# Patient Record
Sex: FEMALE | Race: WHITE | NOT HISPANIC OR LATINO | URBAN - NONMETROPOLITAN AREA
[De-identification: names, ages, dates, MRNs, and addresses within clinical notes are randomized per-mention and may not be internally consistent; named-entity substitution may affect disease eponyms.]

---

## 2020-10-02 ENCOUNTER — LAB OUTSIDE AN ENCOUNTER (OUTPATIENT)
Dept: URBAN - NONMETROPOLITAN AREA CLINIC 13 | Facility: CLINIC | Age: 80
End: 2020-10-02

## 2020-10-02 ENCOUNTER — OFFICE VISIT (OUTPATIENT)
Dept: URBAN - NONMETROPOLITAN AREA CLINIC 13 | Facility: CLINIC | Age: 80
End: 2020-10-02
Payer: MEDICARE

## 2020-10-02 DIAGNOSIS — K44.9 DIAPHRAGMATIC HERNIA: ICD-10-CM

## 2020-10-02 DIAGNOSIS — K21.9 GERD: ICD-10-CM

## 2020-10-02 DIAGNOSIS — K59.00 CONSTIPATION, UNSPECIFIED CONSTIPATION TYPE: ICD-10-CM

## 2020-10-02 DIAGNOSIS — R10.13 EPIGASTRIC PAIN: ICD-10-CM

## 2020-10-02 DIAGNOSIS — R13.10 DYSPHAGIA: ICD-10-CM

## 2020-10-02 DIAGNOSIS — R11.0 NAUSEA: ICD-10-CM

## 2020-10-02 DIAGNOSIS — Z12.11 SCREENING FOR COLON CANCER: ICD-10-CM

## 2020-10-02 PROCEDURE — G8427 DOCREV CUR MEDS BY ELIG CLIN: HCPCS | Performed by: INTERNAL MEDICINE

## 2020-10-02 PROCEDURE — G8417 CALC BMI ABV UP PARAM F/U: HCPCS | Performed by: INTERNAL MEDICINE

## 2020-10-02 PROCEDURE — G9903 PT SCRN TBCO ID AS NON USER: HCPCS | Performed by: INTERNAL MEDICINE

## 2020-10-02 PROCEDURE — 99214 OFFICE O/P EST MOD 30 MIN: CPT | Performed by: INTERNAL MEDICINE

## 2020-10-02 PROCEDURE — G8420 CALC BMI NORM PARAMETERS: HCPCS | Performed by: INTERNAL MEDICINE

## 2020-10-02 RX ORDER — CHLORHEXIDINE GLUCONATE 4 %
LIQUID (ML) TOPICAL
Qty: 0 | Refills: 0 | Status: ACTIVE | COMMUNITY
Start: 1900-01-01

## 2020-10-02 RX ORDER — FLUTICASONE PROPIONATE 50 UG/1
SPRAY, METERED NASAL
Qty: 0 | Refills: 0 | Status: ACTIVE | COMMUNITY
Start: 1900-01-01

## 2020-10-02 RX ORDER — FAMOTIDINE 20 MG/1
1 TABLET AT BEDTIME AS NEEDED TABLET, FILM COATED ORAL ONCE A DAY
Qty: 30 TABLET | Refills: 6 | OUTPATIENT
Start: 2020-10-02

## 2020-10-02 RX ORDER — HYDROCHLOROTHIAZIDE 25 MG/1
1 TABLET IN THE MORNING TABLET ORAL ONCE A DAY
Status: ACTIVE | COMMUNITY

## 2020-10-02 RX ORDER — LATANOPROST/PF 0.005 %
DROPS OPHTHALMIC (EYE)
Qty: 0 | Refills: 0 | Status: ACTIVE | COMMUNITY
Start: 1900-01-01

## 2020-10-02 RX ORDER — GLUCOSAMINE SULFATE 500 MG
TABLET ORAL
Qty: 0 | Refills: 0 | Status: ON HOLD | COMMUNITY
Start: 1900-01-01

## 2020-10-02 NOTE — HPI-OTHER HISTORIES
4/2/19   Ms. Greenberg presents today for f/u of her history of hiatal hernia with dysphagia, reflux and a history of colon polyps.  Since our last visit, she did have an UGI.  It did reveal evidence of a previous Nissen with a small hernia and a non-obstructing ring at the distal esophagus.   A pill passed without difficulty.  Her dysphagia has immproved.  As long as she eats slowly, she feels that her symptoms are fairly well controlled.   Her bowels have been regular.  She is due for a repeat colonoscopy.   The patient had a previous colonoscopy approximately 5 years ago. It revealed a polyp in the right colon. The patient reports the risk factors for colon cancer of a history of polyps. There is no recent history of rectal bleeding.  THe patient was seen at [facility]. She had hiatal hernia surgery done in 2014 and a re-do in 2015.  She had a large hiatal hernia causing reflux.  Recently, her symtpoms have become worse.  She is having more shortness of breath.  She is also having dysphagia. This mainly occurs at night.  She is on coumadin for protein C/S deficiency.

## 2020-10-02 NOTE — HPI-TODAY'S VISIT:
HPI : The patient presents today for follow-up of her reflux and dysphasia.  She has been having worsening problems with nausea and epigastric pain.  She wakes up almost every morning with ongoing nausea, and it gets worse throughout the day.  She denies any dysphasia, but she also has epigastric pain.  She feels that if she gets constipated at all, she will have nausea and vomiting.  She is taking omeprazole 20 mg daily.  She is not taking anything for her bowels at this time.  She is willing to repeat her EGD given her ongoing symptoms.

## 2020-11-05 ENCOUNTER — OFFICE VISIT (OUTPATIENT)
Dept: URBAN - METROPOLITAN AREA MEDICAL CENTER 1 | Facility: MEDICAL CENTER | Age: 80
End: 2020-11-05
Payer: MEDICARE

## 2020-11-05 DIAGNOSIS — K22.2 ACQUIRED ESOPHAGEAL RING: ICD-10-CM

## 2020-11-05 DIAGNOSIS — K29.30 CHRONIC SUPERFICIAL GASTRITIS: ICD-10-CM

## 2020-11-05 DIAGNOSIS — K20.80 LOS ANGELES GRADE B ESOPHAGITIS: ICD-10-CM

## 2020-11-05 PROCEDURE — 43249 ESOPH EGD DILATION <30 MM: CPT | Performed by: INTERNAL MEDICINE

## 2020-11-05 PROCEDURE — 43239 EGD BIOPSY SINGLE/MULTIPLE: CPT | Performed by: INTERNAL MEDICINE

## 2020-12-14 ENCOUNTER — OFFICE VISIT (OUTPATIENT)
Dept: URBAN - NONMETROPOLITAN AREA CLINIC 2 | Facility: CLINIC | Age: 80
End: 2020-12-14
Payer: MEDICARE

## 2020-12-14 VITALS
SYSTOLIC BLOOD PRESSURE: 139 MMHG | HEIGHT: 65 IN | TEMPERATURE: 96.9 F | WEIGHT: 180 LBS | BODY MASS INDEX: 29.99 KG/M2 | HEART RATE: 76 BPM | DIASTOLIC BLOOD PRESSURE: 82 MMHG

## 2020-12-14 DIAGNOSIS — K44.9 DIAPHRAGMATIC HERNIA: ICD-10-CM

## 2020-12-14 DIAGNOSIS — R13.10 DYSPHAGIA: ICD-10-CM

## 2020-12-14 DIAGNOSIS — Z12.11 SCREENING FOR COLON CANCER: ICD-10-CM

## 2020-12-14 DIAGNOSIS — K21.9 GERD: ICD-10-CM

## 2020-12-14 DIAGNOSIS — K59.00 CONSTIPATION, UNSPECIFIED CONSTIPATION TYPE: ICD-10-CM

## 2020-12-14 PROCEDURE — G8482 FLU IMMUNIZE ORDER/ADMIN: HCPCS | Performed by: NURSE PRACTITIONER

## 2020-12-14 PROCEDURE — G8427 DOCREV CUR MEDS BY ELIG CLIN: HCPCS | Performed by: NURSE PRACTITIONER

## 2020-12-14 PROCEDURE — 99213 OFFICE O/P EST LOW 20 MIN: CPT | Performed by: NURSE PRACTITIONER

## 2020-12-14 PROCEDURE — G8417 CALC BMI ABV UP PARAM F/U: HCPCS | Performed by: NURSE PRACTITIONER

## 2020-12-14 PROCEDURE — 1036F TOBACCO NON-USER: CPT | Performed by: NURSE PRACTITIONER

## 2020-12-14 RX ORDER — OMEPRAZOLE 40 MG/1
1 CAPSULE 30 MINUTES BEFORE MORNING MEAL CAPSULE, DELAYED RELEASE ORAL ONCE A DAY
Status: ACTIVE | COMMUNITY

## 2020-12-14 RX ORDER — LATANOPROST/PF 0.005 %
DROPS OPHTHALMIC (EYE)
Qty: 0 | Refills: 0 | Status: ACTIVE | COMMUNITY
Start: 1900-01-01

## 2020-12-14 RX ORDER — CHLORHEXIDINE GLUCONATE 4 %
LIQUID (ML) TOPICAL
Qty: 0 | Refills: 0 | Status: ACTIVE | COMMUNITY
Start: 1900-01-01

## 2020-12-14 RX ORDER — FAMOTIDINE 20 MG/1
1 TABLET AT BEDTIME AS NEEDED TABLET, FILM COATED ORAL ONCE A DAY
Qty: 30 TABLET | Refills: 6 | Status: ACTIVE | COMMUNITY
Start: 2020-10-02

## 2020-12-14 RX ORDER — FLUTICASONE PROPIONATE 50 UG/1
SPRAY, METERED NASAL
Qty: 0 | Refills: 0 | Status: ACTIVE | COMMUNITY
Start: 1900-01-01

## 2020-12-14 RX ORDER — HYDROCHLOROTHIAZIDE 25 MG/1
1 TABLET IN THE MORNING TABLET ORAL ONCE A DAY
Status: ACTIVE | COMMUNITY

## 2020-12-14 RX ORDER — GLUCOSAMINE SULFATE 500 MG
TABLET ORAL
Qty: 0 | Refills: 0 | Status: ON HOLD | COMMUNITY
Start: 1900-01-01

## 2020-12-14 RX ORDER — FLUTICASONE PROPIONATE 50 UG/1
1 SPRAY IN EACH NOSTRIL SPRAY, METERED NASAL ONCE A DAY
Status: ACTIVE | COMMUNITY

## 2020-12-14 RX ORDER — CETIRIZINE HYDROCHLORIDE 10 MG/1
1 TABLET TABLET, FILM COATED ORAL ONCE A DAY
Status: ACTIVE | COMMUNITY

## 2020-12-14 RX ORDER — BUDESONIDE AND FORMOTEROL FUMARATE DIHYDRATE 80; 4.5 UG/1; UG/1
2 PUFFS AEROSOL RESPIRATORY (INHALATION) ONCE A DAY
Status: ACTIVE | COMMUNITY

## 2020-12-14 NOTE — HPI-TODAY'S VISIT:
4/2/19 Ms. Greenberg presents today for f/u of her history of hiatal hernia with dysphagia, reflux and a history of colon polyps.  Since our last visit, she did have an UGI.  It did reveal evidence of a previous Nissen with a small hernia and a non-obstructing ring at the distal esophagus.   A pill passed without difficulty.  Her dysphagia has immproved.  As long as she eats slowly, she feels that her symptoms are fairly well controlled.   Her bowels have been regular.  She is due for a repeat colonoscopy.   The patient had a previous colonoscopy approximately 5 years ago. It revealed a polyp in the right colon. The patient reports the risk factors for colon cancer of a history of polyps. There is no recent history of rectal bleeding.  THe patient was seen at [facility]. She had hiatal hernia surgery done in 2014 and a re-do in 2015.  She had a large hiatal hernia causing reflux.  Recently, her symtpoms have become worse.  She is having more shortness of breath.  She is also having dysphagia. This mainly occurs at night.  She is on coumadin for protein C/S deficiency.  10/2/2020 The patient presents today for follow-up of her reflux and dysphasia.  She has been having worsening problems with nausea and epigastric pain.  She wakes up almost every morning with ongoing nausea, and it gets worse throughout the day.  She denies any dysphasia, but she also has epigastric pain.  She feels that if she gets constipated at all, she will have nausea and vomiting.  She is taking omeprazole 20 mg daily.  She is not taking anything for her bowels at this time.  She is willing to repeat her EGD given her ongoing symptoms.   12/14/2020   Mrs. Greenberg presents for endoscoy follow up. Her EGD shows mild reflux and gastritis along iwht a 3-4 cm hiatal hernia and schatsky's ring s/p dilation. Her PCP increased her omeprazole to 40mg daily and she is having no futher reflux or chest pressure at night. She denies dysphagia or odynophagia. She is not taking the miralax or fiber but agrees to try the fiber for bowel irregularity. Tdoay she is doing well otherwise. MB

## 2021-06-14 ENCOUNTER — WEB ENCOUNTER (OUTPATIENT)
Dept: URBAN - NONMETROPOLITAN AREA CLINIC 2 | Facility: CLINIC | Age: 81
End: 2021-06-14

## 2021-06-14 ENCOUNTER — OFFICE VISIT (OUTPATIENT)
Dept: URBAN - NONMETROPOLITAN AREA CLINIC 2 | Facility: CLINIC | Age: 81
End: 2021-06-14
Payer: MEDICARE

## 2021-06-14 VITALS
HEART RATE: 74 BPM | TEMPERATURE: 96.8 F | HEIGHT: 65 IN | SYSTOLIC BLOOD PRESSURE: 121 MMHG | BODY MASS INDEX: 29.99 KG/M2 | DIASTOLIC BLOOD PRESSURE: 70 MMHG | WEIGHT: 180 LBS

## 2021-06-14 DIAGNOSIS — K44.9 DIAPHRAGMATIC HERNIA: ICD-10-CM

## 2021-06-14 DIAGNOSIS — K59.00 CONSTIPATION, UNSPECIFIED CONSTIPATION TYPE: ICD-10-CM

## 2021-06-14 DIAGNOSIS — Z12.11 SCREENING FOR COLON CANCER: ICD-10-CM

## 2021-06-14 DIAGNOSIS — K21.9 GERD: ICD-10-CM

## 2021-06-14 DIAGNOSIS — R13.10 DYSPHAGIA: ICD-10-CM

## 2021-06-14 PROCEDURE — 99214 OFFICE O/P EST MOD 30 MIN: CPT | Performed by: INTERNAL MEDICINE

## 2021-06-14 RX ORDER — HYDROCHLOROTHIAZIDE 25 MG/1
1 TABLET IN THE MORNING TABLET ORAL ONCE A DAY
Status: ACTIVE | COMMUNITY

## 2021-06-14 RX ORDER — FAMOTIDINE 20 MG/1
1 TABLET AT BEDTIME AS NEEDED TABLET, FILM COATED ORAL ONCE A DAY
Qty: 30 TABLET | Refills: 6 | Status: ON HOLD | COMMUNITY
Start: 2020-10-02

## 2021-06-14 RX ORDER — CHLORHEXIDINE GLUCONATE 4 %
LIQUID (ML) TOPICAL
Qty: 0 | Refills: 0 | Status: ACTIVE | COMMUNITY
Start: 1900-01-01

## 2021-06-14 RX ORDER — GLUCOSAMINE SULFATE 500 MG
TABLET ORAL
Qty: 0 | Refills: 0 | Status: ACTIVE | COMMUNITY
Start: 1900-01-01

## 2021-06-14 RX ORDER — OMEPRAZOLE 40 MG/1
1 CAPSULE 30 MINUTES BEFORE MORNING MEAL CAPSULE, DELAYED RELEASE ORAL ONCE A DAY
Status: ACTIVE | COMMUNITY

## 2021-06-14 RX ORDER — CETIRIZINE HYDROCHLORIDE 10 MG/1
1 TABLET TABLET, FILM COATED ORAL ONCE A DAY
Status: ACTIVE | COMMUNITY

## 2021-06-14 RX ORDER — FLUTICASONE PROPIONATE 50 UG/1
SPRAY, METERED NASAL
Qty: 0 | Refills: 0 | Status: ON HOLD | COMMUNITY
Start: 1900-01-01

## 2021-06-14 RX ORDER — FLUTICASONE PROPIONATE 50 UG/1
1 SPRAY IN EACH NOSTRIL SPRAY, METERED NASAL ONCE A DAY
Status: ACTIVE | COMMUNITY

## 2021-06-14 RX ORDER — LATANOPROST/PF 0.005 %
DROPS OPHTHALMIC (EYE)
Qty: 0 | Refills: 0 | Status: ACTIVE | COMMUNITY
Start: 1900-01-01

## 2021-06-14 RX ORDER — BUDESONIDE AND FORMOTEROL FUMARATE DIHYDRATE 80; 4.5 UG/1; UG/1
2 PUFFS AEROSOL RESPIRATORY (INHALATION) ONCE A DAY
Status: ACTIVE | COMMUNITY

## 2021-06-14 NOTE — HPI-TODAY'S VISIT:
4/2/19 Ms. Greenberg presents today for f/u of her history of hiatal hernia with dysphagia, reflux and a history of colon polyps.  Since our last visit, she did have an UGI.  It did reveal evidence of a previous Nissen with a small hernia and a non-obstructing ring at the distal esophagus.   A pill passed without difficulty.  Her dysphagia has immproved.  As long as she eats slowly, she feels that her symptoms are fairly well controlled.   Her bowels have been regular.  She is due for a repeat colonoscopy.   The patient had a previous colonoscopy approximately 5 years ago. It revealed a polyp in the right colon. The patient reports the risk factors for colon cancer of a history of polyps. There is no recent history of rectal bleeding.  THe patient was seen at [facility]. She had hiatal hernia surgery done in 2014 and a re-do in 2015.  She had a large hiatal hernia causing reflux.  Recently, her symtpoms have become worse.  She is having more shortness of breath.  She is also having dysphagia. This mainly occurs at night.  She is on coumadin for protein C/S deficiency.  10/2/2020 The patient presents today for follow-up of her reflux and dysphasia.  She has been having worsening problems with nausea and epigastric pain.  She wakes up almost every morning with ongoing nausea, and it gets worse throughout the day.  She denies any dysphasia, but she also has epigastric pain.  She feels that if she gets constipated at all, she will have nausea and vomiting.  She is taking omeprazole 20 mg daily.  She is not taking anything for her bowels at this time.  She is willing to repeat her EGD given her ongoing symptoms.   12/14/2020  Mrs. Greenberg presents for endoscoy follow up. Her EGD shows mild reflux and gastritis along iwht a 3-4 cm hiatal hernia and schatsky's ring s/p dilation. Her PCP increased her omeprazole to 40mg daily and she is having no futher reflux or chest pressure at night. She denies dysphagia or odynophagia. She is not taking the miralax or fiber but agrees to try the fiber for bowel irregularity. Tdoay she is doing well otherwise. MB   6/14/2021  Mrs. Greenberg presents for follow up of reflux, hiatal hernia, and dysphagia. She is doing well on omeprazole 40mg daily. She still has occasional dysphagia once a week or so but doesn't feel that it is severe and declines repeat EGD with dilation. Her bowels are fairly regular when she remembers to take her fiber but is forgetful of this. Today she is doing well with no new GI complaints. MB

## 2021-06-17 ENCOUNTER — WEB ENCOUNTER (OUTPATIENT)
Dept: URBAN - NONMETROPOLITAN AREA CLINIC 2 | Facility: CLINIC | Age: 81
End: 2021-06-17

## 2021-10-01 ENCOUNTER — OFFICE VISIT (OUTPATIENT)
Dept: URBAN - NONMETROPOLITAN AREA CLINIC 13 | Facility: CLINIC | Age: 81
End: 2021-10-01

## 2021-12-13 ENCOUNTER — OFFICE VISIT (OUTPATIENT)
Dept: URBAN - NONMETROPOLITAN AREA CLINIC 2 | Facility: CLINIC | Age: 81
End: 2021-12-13

## 2022-02-15 ENCOUNTER — OFFICE VISIT (OUTPATIENT)
Dept: URBAN - NONMETROPOLITAN AREA CLINIC 2 | Facility: CLINIC | Age: 82
End: 2022-02-15
Payer: MEDICARE

## 2022-02-15 VITALS
DIASTOLIC BLOOD PRESSURE: 73 MMHG | BODY MASS INDEX: 27.49 KG/M2 | HEIGHT: 65 IN | WEIGHT: 165 LBS | TEMPERATURE: 96.5 F | SYSTOLIC BLOOD PRESSURE: 129 MMHG | HEART RATE: 80 BPM

## 2022-02-15 DIAGNOSIS — K59.09 CONSTIPATION: ICD-10-CM

## 2022-02-15 DIAGNOSIS — K21.9 GERD: ICD-10-CM

## 2022-02-15 DIAGNOSIS — Z12.11 SCREENING FOR COLON CANCER: ICD-10-CM

## 2022-02-15 DIAGNOSIS — K44.9 DIAPHRAGMATIC HERNIA: ICD-10-CM

## 2022-02-15 DIAGNOSIS — R13.19 DYSPHAGIA: ICD-10-CM

## 2022-02-15 PROCEDURE — 99214 OFFICE O/P EST MOD 30 MIN: CPT | Performed by: NURSE PRACTITIONER

## 2022-02-15 RX ORDER — FLUTICASONE PROPIONATE 50 UG/1
1 SPRAY IN EACH NOSTRIL SPRAY, METERED NASAL ONCE A DAY
Status: ACTIVE | COMMUNITY

## 2022-02-15 RX ORDER — OMEPRAZOLE 40 MG/1
1 CAPSULE 30 MINUTES BEFORE MORNING MEAL CAPSULE, DELAYED RELEASE ORAL ONCE A DAY
Status: ACTIVE | COMMUNITY

## 2022-02-15 RX ORDER — BUDESONIDE AND FORMOTEROL FUMARATE DIHYDRATE 80; 4.5 UG/1; UG/1
2 PUFFS AEROSOL RESPIRATORY (INHALATION) ONCE A DAY
Status: ACTIVE | COMMUNITY

## 2022-02-15 RX ORDER — HYDROCHLOROTHIAZIDE 25 MG/1
1 TABLET IN THE MORNING TABLET ORAL ONCE A DAY
Status: ACTIVE | COMMUNITY

## 2022-02-15 RX ORDER — FLUTICASONE PROPIONATE 50 UG/1
SPRAY, METERED NASAL
Qty: 0 | Refills: 0 | Status: ON HOLD | COMMUNITY
Start: 1900-01-01

## 2022-02-15 RX ORDER — LATANOPROST/PF 0.005 %
DROPS OPHTHALMIC (EYE)
Qty: 0 | Refills: 0 | Status: ACTIVE | COMMUNITY
Start: 1900-01-01

## 2022-02-15 RX ORDER — GLUCOSAMINE SULFATE 500 MG
TABLET ORAL
Qty: 0 | Refills: 0 | Status: ACTIVE | COMMUNITY
Start: 1900-01-01

## 2022-02-15 RX ORDER — FAMOTIDINE 20 MG/1
1 TABLET AT BEDTIME AS NEEDED TABLET, FILM COATED ORAL ONCE A DAY
Qty: 30 TABLET | Refills: 6 | Status: ON HOLD | COMMUNITY
Start: 2020-10-02

## 2022-02-15 RX ORDER — CHLORHEXIDINE GLUCONATE 4 %
LIQUID (ML) TOPICAL
Qty: 0 | Refills: 0 | Status: ACTIVE | COMMUNITY
Start: 1900-01-01

## 2022-02-15 RX ORDER — CETIRIZINE HYDROCHLORIDE 10 MG/1
1 TABLET TABLET, FILM COATED ORAL ONCE A DAY
Status: ACTIVE | COMMUNITY

## 2022-02-15 NOTE — HPI-TODAY'S VISIT:
4/2/19 Ms. Greenberg presents today for f/u of her history of hiatal hernia with dysphagia, reflux and a history of colon polyps.  Since our last visit, she did have an UGI.  It did reveal evidence of a previous Nissen with a small hernia and a non-obstructing ring at the distal esophagus.   A pill passed without difficulty.  Her dysphagia has immproved.  As long as she eats slowly, she feels that her symptoms are fairly well controlled.   Her bowels have been regular.  She is due for a repeat colonoscopy.   The patient had a previous colonoscopy approximately 5 years ago. It revealed a polyp in the right colon. The patient reports the risk factors for colon cancer of a history of polyps. There is no recent history of rectal bleeding.  THe patient was seen at [facility]. She had hiatal hernia surgery done in 2014 and a re-do in 2015.  She had a large hiatal hernia causing reflux.  Recently, her symtpoms have become worse.  She is having more shortness of breath.  She is also having dysphagia. This mainly occurs at night.  She is on coumadin for protein C/S deficiency.  10/2/2020 The patient presents today for follow-up of her reflux and dysphasia.  She has been having worsening problems with nausea and epigastric pain.  She wakes up almost every morning with ongoing nausea, and it gets worse throughout the day.  She denies any dysphasia, but she also has epigastric pain.  She feels that if she gets constipated at all, she will have nausea and vomiting.  She is taking omeprazole 20 mg daily.  She is not taking anything for her bowels at this time.  She is willing to repeat her EGD given her ongoing symptoms.   12/14/2020  Mrs. Greenberg presents for endoscoy follow up. Her EGD shows mild reflux and gastritis along iwht a 3-4 cm hiatal hernia and schatsky's ring s/p dilation. Her PCP increased her omeprazole to 40mg daily and she is having no futher reflux or chest pressure at night. She denies dysphagia or odynophagia. She is not taking the miralax or fiber but agrees to try the fiber for bowel irregularity. Tdoay she is doing well otherwise. MB   6/14/2021  Mrs. Greenberg presents for follow up of reflux, hiatal hernia, and dysphagia. She is doing well on omeprazole 40mg daily. She still has occasional dysphagia once a week or so but doesn't feel that it is severe and declines repeat EGD with dilation. Her bowels are fairly regular when she remembers to take her fiber but is forgetful of this. Today she is doing well with no new GI complaints. MB   2/15/2022 Elsy presents for follow up of reflux, hiatal hernia, dysphagia and constipation. She is on omeprazole 40mg daily. She still has occasional dysphagia with regurgitation of the bolus particularly with rice. She doesn't feel like she needs a dilation. She has not had her DEXA this year. She does feel the omeprazole 40mg daily helps her reflux and dysphagia. She can't take metamucil. She has been using stool softeners as needed with improvement. Today she is doing well otherwise. MB

## 2023-02-21 ENCOUNTER — OFFICE VISIT (OUTPATIENT)
Dept: URBAN - NONMETROPOLITAN AREA CLINIC 2 | Facility: CLINIC | Age: 83
End: 2023-02-21

## 2023-02-23 ENCOUNTER — LAB OUTSIDE AN ENCOUNTER (OUTPATIENT)
Dept: URBAN - NONMETROPOLITAN AREA CLINIC 2 | Facility: CLINIC | Age: 83
End: 2023-02-23

## 2023-02-23 ENCOUNTER — OFFICE VISIT (OUTPATIENT)
Dept: URBAN - NONMETROPOLITAN AREA CLINIC 2 | Facility: CLINIC | Age: 83
End: 2023-02-23
Payer: MEDICARE

## 2023-02-23 VITALS
HEIGHT: 65 IN | HEART RATE: 80 BPM | WEIGHT: 165.4 LBS | TEMPERATURE: 97.5 F | SYSTOLIC BLOOD PRESSURE: 122 MMHG | DIASTOLIC BLOOD PRESSURE: 81 MMHG | BODY MASS INDEX: 27.56 KG/M2

## 2023-02-23 DIAGNOSIS — R10.31 RLQ ABDOMINAL PAIN: ICD-10-CM

## 2023-02-23 DIAGNOSIS — K44.9 DIAPHRAGMATIC HERNIA: ICD-10-CM

## 2023-02-23 DIAGNOSIS — K21.9 GERD: ICD-10-CM

## 2023-02-23 DIAGNOSIS — R13.10 DYSPHAGIA: ICD-10-CM

## 2023-02-23 PROCEDURE — 99214 OFFICE O/P EST MOD 30 MIN: CPT | Performed by: NURSE PRACTITIONER

## 2023-02-23 RX ORDER — LATANOPROST/PF 0.005 %
DROPS OPHTHALMIC (EYE)
Qty: 0 | Refills: 0 | Status: ACTIVE | COMMUNITY
Start: 1900-01-01

## 2023-02-23 RX ORDER — HYDROCHLOROTHIAZIDE 25 MG/1
1 TABLET IN THE MORNING TABLET ORAL ONCE A DAY
Status: ACTIVE | COMMUNITY

## 2023-02-23 RX ORDER — CETIRIZINE HYDROCHLORIDE 10 MG/1
1 TABLET TABLET, FILM COATED ORAL ONCE A DAY
Status: ACTIVE | COMMUNITY

## 2023-02-23 RX ORDER — BUDESONIDE AND FORMOTEROL FUMARATE DIHYDRATE 80; 4.5 UG/1; UG/1
2 PUFFS AEROSOL RESPIRATORY (INHALATION) ONCE A DAY
Status: ACTIVE | COMMUNITY

## 2023-02-23 RX ORDER — FAMOTIDINE 20 MG/1
1 TABLET AT BEDTIME AS NEEDED TABLET, FILM COATED ORAL ONCE A DAY
Qty: 30 TABLET | Refills: 6 | Status: ON HOLD | COMMUNITY
Start: 2020-10-02

## 2023-02-23 RX ORDER — FLUTICASONE PROPIONATE 50 UG/1
1 SPRAY IN EACH NOSTRIL SPRAY, METERED NASAL ONCE A DAY
Status: ACTIVE | COMMUNITY

## 2023-02-23 RX ORDER — CHLORHEXIDINE GLUCONATE 4 %
LIQUID (ML) TOPICAL
Qty: 0 | Refills: 0 | Status: ACTIVE | COMMUNITY
Start: 1900-01-01

## 2023-02-23 RX ORDER — OMEPRAZOLE 40 MG/1
1 CAPSULE 30 MINUTES BEFORE MORNING MEAL CAPSULE, DELAYED RELEASE ORAL ONCE A DAY
Status: ACTIVE | COMMUNITY

## 2023-02-23 RX ORDER — GLUCOSAMINE SULFATE 500 MG
TABLET ORAL
Qty: 0 | Refills: 0 | Status: ACTIVE | COMMUNITY
Start: 1900-01-01

## 2023-02-23 RX ORDER — FLUTICASONE PROPIONATE 50 UG/1
SPRAY, METERED NASAL
Qty: 0 | Refills: 0 | Status: ON HOLD | COMMUNITY
Start: 1900-01-01

## 2023-02-23 NOTE — HPI-TODAY'S VISIT:
2/23/2023 Elsy presents for follow-up of reflux, dysphagia, and constipation.  Her reflux is stable on omeprazole 40 mg daily.  She does have epigastric abdominal discomfort occasionally when lying down.  This relieves immediately, she associates this with her hernia.  Her bowels have been moving daily on a stool softener however she has had some increased right lower quadrant abdominal pain.  Her last colonoscopy was in 2019 with 1 benign polyp otherwise fairly normal.  The pain is relieved with a bowel movement.  We have discussed the differential, I suspect this is gas trapping and spasm.  I want her to add a half capful of MiraLAX daily.  We will do a KUB to rule out any kidney stone as her pain does radiate to her back at times.  Consider contrasted imaging if her symptoms worsen and no relief with a bowel regimen.  MB

## 2023-02-27 ENCOUNTER — TELEPHONE ENCOUNTER (OUTPATIENT)
Dept: URBAN - METROPOLITAN AREA CLINIC 92 | Facility: CLINIC | Age: 83
End: 2023-02-27

## 2023-08-25 ENCOUNTER — WEB ENCOUNTER (OUTPATIENT)
Dept: URBAN - NONMETROPOLITAN AREA CLINIC 2 | Facility: CLINIC | Age: 83
End: 2023-08-25

## 2023-08-31 ENCOUNTER — CLAIMS CREATED FROM THE CLAIM WINDOW (OUTPATIENT)
Dept: URBAN - METROPOLITAN AREA TELEHEALTH 2 | Facility: TELEHEALTH | Age: 83
End: 2023-08-31
Payer: MEDICARE

## 2023-08-31 ENCOUNTER — OFFICE VISIT (OUTPATIENT)
Dept: URBAN - NONMETROPOLITAN AREA CLINIC 2 | Facility: CLINIC | Age: 83
End: 2023-08-31
Payer: MEDICARE

## 2023-08-31 ENCOUNTER — LAB OUTSIDE AN ENCOUNTER (OUTPATIENT)
Dept: URBAN - NONMETROPOLITAN AREA CLINIC 2 | Facility: CLINIC | Age: 83
End: 2023-08-31

## 2023-08-31 VITALS
BODY MASS INDEX: 27.49 KG/M2 | SYSTOLIC BLOOD PRESSURE: 108 MMHG | WEIGHT: 165 LBS | HEIGHT: 65 IN | DIASTOLIC BLOOD PRESSURE: 62 MMHG | HEART RATE: 74 BPM | TEMPERATURE: 98.6 F

## 2023-08-31 DIAGNOSIS — R13.10 DYSPHAGIA: ICD-10-CM

## 2023-08-31 DIAGNOSIS — K44.9 DIAPHRAGMATIC HERNIA: ICD-10-CM

## 2023-08-31 DIAGNOSIS — K59.00 CONSTIPATION, UNSPECIFIED CONSTIPATION TYPE: ICD-10-CM

## 2023-08-31 DIAGNOSIS — K21.9 GERD: ICD-10-CM

## 2023-08-31 DIAGNOSIS — Z12.11 SCREENING FOR COLON CANCER: ICD-10-CM

## 2023-08-31 DIAGNOSIS — R11.12 PROJECTILE VOMITING WITHOUT NAUSEA: ICD-10-CM

## 2023-08-31 PROBLEM — 14760008: Status: ACTIVE | Noted: 2020-10-02

## 2023-08-31 PROCEDURE — 99214 OFFICE O/P EST MOD 30 MIN: CPT | Performed by: NURSE PRACTITIONER

## 2023-08-31 PROCEDURE — 99442 PHONE E/M BY PHYS 11-20 MIN: CPT | Performed by: NURSE PRACTITIONER

## 2023-08-31 RX ORDER — OMEPRAZOLE 40 MG/1
1 CAPSULE 30 MINUTES BEFORE MORNING MEAL CAPSULE, DELAYED RELEASE ORAL ONCE A DAY
Status: ON HOLD | COMMUNITY

## 2023-08-31 RX ORDER — GLUCOSAMINE SULFATE 500 MG
TABLET ORAL
Qty: 0 | Refills: 0 | Status: ACTIVE | COMMUNITY
Start: 1900-01-01

## 2023-08-31 RX ORDER — EVOLOCUMAB 140 MG/ML
1 ML INJECTION, SOLUTION SUBCUTANEOUS
Status: ACTIVE | COMMUNITY

## 2023-08-31 RX ORDER — FLUTICASONE PROPIONATE 50 UG/1
SPRAY, METERED NASAL
Qty: 0 | Refills: 0 | Status: ON HOLD | COMMUNITY
Start: 1900-01-01

## 2023-08-31 RX ORDER — FLUTICASONE PROPIONATE 50 UG/1
1 SPRAY IN EACH NOSTRIL SPRAY, METERED NASAL ONCE A DAY
Status: ON HOLD | COMMUNITY

## 2023-08-31 RX ORDER — DOCUSATE SODIUM 100 MG/1
1 CAPSULE AS NEEDED CAPSULE ORAL ONCE A DAY
Status: ACTIVE | COMMUNITY

## 2023-08-31 RX ORDER — LATANOPROST/PF 0.005 %
DROPS OPHTHALMIC (EYE)
Qty: 0 | Refills: 0 | Status: ACTIVE | COMMUNITY
Start: 1900-01-01

## 2023-08-31 RX ORDER — FLUTICASONE PROPIONATE 50 UG/1
1 SPRAY IN EACH NOSTRIL SPRAY, METERED NASAL ONCE A DAY
Status: ACTIVE | COMMUNITY

## 2023-08-31 RX ORDER — FAMOTIDINE 40 MG/1
1 TABLET BEFORE SUPPER TABLET, FILM COATED ORAL ONCE A DAY
Qty: 90 TABLET | Refills: 3 | OUTPATIENT
Start: 2023-08-31

## 2023-08-31 RX ORDER — CETIRIZINE HYDROCHLORIDE 10 MG/1
1 TABLET TABLET, FILM COATED ORAL ONCE A DAY
Status: ON HOLD | COMMUNITY

## 2023-08-31 RX ORDER — FAMOTIDINE 20 MG/1
1 TABLET AT BEDTIME AS NEEDED TABLET, FILM COATED ORAL ONCE A DAY
Qty: 30 TABLET | Refills: 6 | Status: ON HOLD | COMMUNITY
Start: 2020-10-02

## 2023-08-31 RX ORDER — CHLORHEXIDINE GLUCONATE 4 %
LIQUID (ML) TOPICAL
Qty: 0 | Refills: 0 | Status: ACTIVE | COMMUNITY
Start: 1900-01-01

## 2023-08-31 RX ORDER — UBIDECARENONE/VIT E ACET 100MG-5
AS DIRECTED CAPSULE ORAL
Status: ACTIVE | COMMUNITY

## 2023-08-31 RX ORDER — HYDROCHLOROTHIAZIDE 25 MG/1
1 TABLET IN THE MORNING TABLET ORAL ONCE A DAY
Status: ACTIVE | COMMUNITY

## 2023-08-31 RX ORDER — BUDESONIDE AND FORMOTEROL FUMARATE DIHYDRATE 80; 4.5 UG/1; UG/1
2 PUFFS AEROSOL RESPIRATORY (INHALATION) ONCE A DAY
Status: ACTIVE | COMMUNITY

## 2023-08-31 NOTE — PHYSICAL EXAM NEUROLOGIC:
oriented to person, place and time ,  , cranial nerves 2-12 grossly intact Sent by neurologist to r/o ICH Sent by neurologist to r/o ICH Sent by neurologist to r/o ICH Sent by neurologist to r/o ICH Sent by neurologist to r/o ICH Sent by neurologist to r/o ICH

## 2023-08-31 NOTE — HPI-TODAY'S VISIT:
2/23/2023 Elsy presents for follow-up of reflux, dysphagia, and constipation.  Her reflux is stable on omeprazole 40 mg daily.  She does have epigastric abdominal discomfort occasionally when lying down.  This relieves immediately, she associates this with her hernia.  Her bowels have been moving daily on a stool softener however she has had some increased right lower quadrant abdominal pain.  Her last colonoscopy was in 2019 with 1 benign polyp otherwise fairly normal.  The pain is relieved with a bowel movement.  We have discussed the differential, I suspect this is gas trapping and spasm.  I want her to add a half capful of MiraLAX daily.  We will do a KUB to rule out any kidney stone as her pain does radiate to her back at times.  Consider contrasted imaging if her symptoms worsen and no relief with a bowel regimen.  MB 8/31/2023 Elsy is an 83-year-old female who presents for evaluation of vomiting.  She states in the past 3 months she had 3 different episodes at 3 different times of the day with vomiting associated with defecation.  She states she has no nausea or abdominal pain associated with the episodes.  She states while straining she develops the urge to vomit and then retches until she is dry heaving.  Her last EGD was in 2020 with a 3 to 4 cm hiatal hernia with a history of Nissen fundoplication x2 at [facility] in the early 2000's.  She is on omeprazole 40 mg daily, she has been unable to wean.  She has no early satiety or bloating.  Her bowels are moving daily on 2 Colace, she is been able to stop the MiraLAX.  Today her main complaint is regurgitation with vomiting.  We will schedule upper endoscopy to rule out esophageal motility disorder or enlarging hiatal hernia.  We will also schedule upper GI.  She would likely benefit from low-dose Reglan however her age is a concern.  I want her to start eating small frequent meals and we will add famotidine before bedtime.  We will follow-up pending her work-up.  MB

## 2023-10-08 ENCOUNTER — TELEPHONE ENCOUNTER (OUTPATIENT)
Dept: URBAN - NONMETROPOLITAN AREA CLINIC 2 | Facility: CLINIC | Age: 83
End: 2023-10-08

## 2023-10-18 ENCOUNTER — WEB ENCOUNTER (OUTPATIENT)
Dept: URBAN - NONMETROPOLITAN AREA CLINIC 2 | Facility: CLINIC | Age: 83
End: 2023-10-18

## 2023-10-25 ENCOUNTER — OFFICE VISIT (OUTPATIENT)
Dept: URBAN - NONMETROPOLITAN AREA CLINIC 13 | Facility: CLINIC | Age: 83
End: 2023-10-25
Payer: MEDICARE

## 2023-10-25 VITALS
SYSTOLIC BLOOD PRESSURE: 137 MMHG | HEIGHT: 65 IN | HEART RATE: 94 BPM | BODY MASS INDEX: 29.02 KG/M2 | WEIGHT: 174.2 LBS | DIASTOLIC BLOOD PRESSURE: 74 MMHG

## 2023-10-25 DIAGNOSIS — R11.12 PROJECTILE VOMITING WITHOUT NAUSEA: ICD-10-CM

## 2023-10-25 DIAGNOSIS — Z12.11 SCREENING FOR COLON CANCER: ICD-10-CM

## 2023-10-25 DIAGNOSIS — K44.9 DIAPHRAGMATIC HERNIA: ICD-10-CM

## 2023-10-25 DIAGNOSIS — K21.9 GERD: ICD-10-CM

## 2023-10-25 DIAGNOSIS — K59.00 CONSTIPATION, UNSPECIFIED CONSTIPATION TYPE: ICD-10-CM

## 2023-10-25 DIAGNOSIS — R13.19 CERVICAL DYSPHAGIA: ICD-10-CM

## 2023-10-25 DIAGNOSIS — R13.10 DYSPHAGIA: ICD-10-CM

## 2023-10-25 PROBLEM — 8579004: Status: ACTIVE | Noted: 2023-08-31

## 2023-10-25 PROCEDURE — 99214 OFFICE O/P EST MOD 30 MIN: CPT | Performed by: NURSE PRACTITIONER

## 2023-10-25 RX ORDER — FLUTICASONE PROPIONATE 50 UG/1
SPRAY, METERED NASAL
Qty: 0 | Refills: 0 | Status: ON HOLD | COMMUNITY
Start: 1900-01-01

## 2023-10-25 RX ORDER — OMEPRAZOLE 40 MG/1
1 CAPSULE 30 MINUTES BEFORE MORNING MEAL CAPSULE, DELAYED RELEASE ORAL ONCE A DAY
Status: ON HOLD | COMMUNITY

## 2023-10-25 RX ORDER — FLUTICASONE PROPIONATE 50 UG/1
1 SPRAY IN EACH NOSTRIL SPRAY, METERED NASAL ONCE A DAY
Status: ON HOLD | COMMUNITY

## 2023-10-25 RX ORDER — GLUCOSAMINE SULFATE 500 MG
TABLET ORAL
Qty: 0 | Refills: 0 | Status: ACTIVE | COMMUNITY
Start: 1900-01-01

## 2023-10-25 RX ORDER — OMEPRAZOLE 40 MG/1
1 CAPSULE CAPSULE, DELAYED RELEASE ORAL
Qty: 180 CAPSULES | Refills: 3

## 2023-10-25 RX ORDER — FAMOTIDINE 40 MG/1
1 TABLET BEFORE SUPPER TABLET, FILM COATED ORAL ONCE A DAY
Qty: 90 TABLET | Refills: 3 | Status: ACTIVE | COMMUNITY
Start: 2023-08-31

## 2023-10-25 RX ORDER — HYDROCHLOROTHIAZIDE 25 MG/1
1 TABLET IN THE MORNING TABLET ORAL ONCE A DAY
Status: ACTIVE | COMMUNITY

## 2023-10-25 RX ORDER — FAMOTIDINE 20 MG/1
1 TABLET AT BEDTIME AS NEEDED TABLET, FILM COATED ORAL ONCE A DAY
Qty: 30 TABLET | Refills: 6 | Status: ON HOLD | COMMUNITY
Start: 2020-10-02

## 2023-10-25 RX ORDER — BUDESONIDE AND FORMOTEROL FUMARATE DIHYDRATE 80; 4.5 UG/1; UG/1
2 PUFFS AEROSOL RESPIRATORY (INHALATION) ONCE A DAY
Status: ACTIVE | COMMUNITY

## 2023-10-25 RX ORDER — FLUTICASONE PROPIONATE 50 UG/1
1 SPRAY IN EACH NOSTRIL SPRAY, METERED NASAL ONCE A DAY
Status: ACTIVE | COMMUNITY

## 2023-10-25 RX ORDER — FAMOTIDINE 40 MG/1
1 TABLET AT BEDTIME TABLET, FILM COATED ORAL ONCE A DAY
Qty: 90 TABLET | Refills: 3
Start: 2023-08-31

## 2023-10-25 RX ORDER — DOCUSATE SODIUM 100 MG/1
1 CAPSULE AS NEEDED CAPSULE ORAL ONCE A DAY
Status: ACTIVE | COMMUNITY

## 2023-10-25 RX ORDER — CETIRIZINE HYDROCHLORIDE 10 MG/1
1 TABLET TABLET, FILM COATED ORAL ONCE A DAY
Status: ON HOLD | COMMUNITY

## 2023-10-25 RX ORDER — EVOLOCUMAB 140 MG/ML
1 ML INJECTION, SOLUTION SUBCUTANEOUS
Status: ACTIVE | COMMUNITY

## 2023-10-25 RX ORDER — LATANOPROST/PF 0.005 %
DROPS OPHTHALMIC (EYE)
Qty: 0 | Refills: 0 | Status: ACTIVE | COMMUNITY
Start: 1900-01-01

## 2023-10-25 RX ORDER — CHLORHEXIDINE GLUCONATE 4 %
LIQUID (ML) TOPICAL
Qty: 0 | Refills: 0 | Status: ACTIVE | COMMUNITY
Start: 1900-01-01

## 2023-10-25 RX ORDER — UBIDECARENONE/VIT E ACET 100MG-5
AS DIRECTED CAPSULE ORAL
Status: ACTIVE | COMMUNITY

## 2023-10-25 NOTE — HPI-TODAY'S VISIT:
2/23/2023 Elsy presents for follow-up of reflux, dysphagia, and constipation.  Her reflux is stable on omeprazole 40 mg daily.  She does have epigastric abdominal discomfort occasionally when lying down.  This relieves immediately, she associates this with her hernia.  Her bowels have been moving daily on a stool softener however she has had some increased right lower quadrant abdominal pain.  Her last colonoscopy was in 2019 with 1 benign polyp otherwise fairly normal.  The pain is relieved with a bowel movement.  We have discussed the differential, I suspect this is gas trapping and spasm.  I want her to add a half capful of MiraLAX daily.  We will do a KUB to rule out any kidney stone as her pain does radiate to her back at times.  Consider contrasted imaging if her symptoms worsen and no relief with a bowel regimen.  MB 8/31/2023 Elsy is an 83-year-old female who presents for evaluation of vomiting.  She states in the past 3 months she had 3 different episodes at 3 different times of the day with vomiting associated with defecation.  She states she has no nausea or abdominal pain associated with the episodes.  She states while straining she develops the urge to vomit and then retches until she is dry heaving.  Her last EGD was in 2020 with a 3 to 4 cm hiatal hernia with a history of Nissen fundoplication x2 at [facility] in the early 2000's.  She is on omeprazole 40 mg daily, she has been unable to wean.  She has no early satiety or bloating.  Her bowels are moving daily on 2 Colace, she is been able to stop the MiraLAX.  Today her main complaint is regurgitation with vomiting.  We will schedule upper endoscopy to rule out esophageal motility disorder or enlarging hiatal hernia.  We will also schedule upper GI.  She would likely benefit from low-dose Reglan however her age is a concern.  I want her to start eating small frequent meals and we will add famotidine before bedtime.  We will follow-up pending her work-up.  MB 10/25/2023 Elsy presents for follow-up of vomiting, and reflux.  Since her last visit her upper GI showed small hiatal hernia.  She started eating smaller meals and taking famotidine before supper.  She had no further episodes of vomiting with defecation.  Recently she saw an ENT for globus.  He is concerned for reflux.  He recommended pantoprazole but this is not covered by her insurance.  Her EGD in 2020 shows reflux and a Schatzki's ring along with a 4 cm hiatal hernia.  Today she agrees to omeprazole 40 mg twice daily and famotidine at night.  I want her to read the dropping acid reflux book, as she agrees her nutrition is poor.  She also has significant kyphosis which is likely also contributing to her reflux.  She is working with physical therapy and chair yoga.  Today her main complaint is globus.  Plan as above.  If no relief would consider repeat upper endoscopy.  MB

## 2024-04-24 ENCOUNTER — OV EP (OUTPATIENT)
Dept: URBAN - NONMETROPOLITAN AREA CLINIC 13 | Facility: CLINIC | Age: 84
End: 2024-04-24
Payer: MEDICARE

## 2024-04-24 VITALS
BODY MASS INDEX: 28.42 KG/M2 | SYSTOLIC BLOOD PRESSURE: 111 MMHG | HEART RATE: 87 BPM | WEIGHT: 170.6 LBS | DIASTOLIC BLOOD PRESSURE: 68 MMHG | HEIGHT: 65 IN

## 2024-04-24 DIAGNOSIS — R13.10 DYSPHAGIA: ICD-10-CM

## 2024-04-24 DIAGNOSIS — R11.12 PROJECTILE VOMITING WITHOUT NAUSEA: ICD-10-CM

## 2024-04-24 DIAGNOSIS — K44.9 DIAPHRAGMATIC HERNIA: ICD-10-CM

## 2024-04-24 DIAGNOSIS — K59.00 CONSTIPATION, UNSPECIFIED CONSTIPATION TYPE: ICD-10-CM

## 2024-04-24 DIAGNOSIS — K21.9 GERD: ICD-10-CM

## 2024-04-24 DIAGNOSIS — Z12.11 SCREENING FOR COLON CANCER: ICD-10-CM

## 2024-04-24 PROCEDURE — 99214 OFFICE O/P EST MOD 30 MIN: CPT | Performed by: NURSE PRACTITIONER

## 2024-04-24 RX ORDER — FAMOTIDINE 40 MG/1
1 TABLET AT BEDTIME TABLET, FILM COATED ORAL ONCE A DAY
Qty: 90 TABLET | Refills: 3 | Status: ACTIVE | COMMUNITY
Start: 2023-08-31

## 2024-04-24 RX ORDER — CHLORHEXIDINE GLUCONATE 4 %
LIQUID (ML) TOPICAL
Qty: 0 | Refills: 0 | Status: ACTIVE | COMMUNITY
Start: 1900-01-01

## 2024-04-24 RX ORDER — HYDROCHLOROTHIAZIDE 25 MG/1
1 TABLET IN THE MORNING TABLET ORAL ONCE A DAY
Status: ACTIVE | COMMUNITY

## 2024-04-24 RX ORDER — CETIRIZINE HYDROCHLORIDE 10 MG/1
1 TABLET TABLET, FILM COATED ORAL ONCE A DAY
Status: ON HOLD | COMMUNITY

## 2024-04-24 RX ORDER — GLUCOSAMINE SULFATE 500 MG
TABLET ORAL
Qty: 0 | Refills: 0 | Status: ACTIVE | COMMUNITY
Start: 1900-01-01

## 2024-04-24 RX ORDER — UBIDECARENONE/VIT E ACET 100MG-5
AS DIRECTED CAPSULE ORAL
Status: ACTIVE | COMMUNITY

## 2024-04-24 RX ORDER — DOCUSATE SODIUM 100 MG/1
1 CAPSULE AS NEEDED CAPSULE ORAL ONCE A DAY
Status: ACTIVE | COMMUNITY

## 2024-04-24 RX ORDER — OMEPRAZOLE 40 MG/1
1 CAPSULE CAPSULE, DELAYED RELEASE ORAL
Qty: 180 CAPSULES | Refills: 3 | Status: ACTIVE | COMMUNITY

## 2024-04-24 RX ORDER — BUDESONIDE AND FORMOTEROL FUMARATE DIHYDRATE 80; 4.5 UG/1; UG/1
2 PUFFS AEROSOL RESPIRATORY (INHALATION) ONCE A DAY
Status: ACTIVE | COMMUNITY

## 2024-04-24 RX ORDER — LATANOPROST/PF 0.005 %
DROPS OPHTHALMIC (EYE)
Qty: 0 | Refills: 0 | Status: ACTIVE | COMMUNITY
Start: 1900-01-01

## 2024-04-24 RX ORDER — FLUTICASONE PROPIONATE 50 UG/1
1 SPRAY IN EACH NOSTRIL SPRAY, METERED NASAL ONCE A DAY
Status: ON HOLD | COMMUNITY

## 2024-04-24 RX ORDER — EVOLOCUMAB 140 MG/ML
1 ML INJECTION, SOLUTION SUBCUTANEOUS
Status: ACTIVE | COMMUNITY

## 2024-04-24 RX ORDER — FLUTICASONE PROPIONATE 50 UG/1
1 SPRAY IN EACH NOSTRIL SPRAY, METERED NASAL ONCE A DAY
Status: ACTIVE | COMMUNITY

## 2024-04-24 RX ORDER — FLUTICASONE PROPIONATE 50 UG/1
SPRAY, METERED NASAL
Qty: 0 | Refills: 0 | Status: ON HOLD | COMMUNITY
Start: 1900-01-01

## 2024-04-24 RX ORDER — FAMOTIDINE 20 MG/1
1 TABLET AT BEDTIME AS NEEDED TABLET, FILM COATED ORAL ONCE A DAY
Qty: 30 TABLET | Refills: 6 | Status: ON HOLD | COMMUNITY
Start: 2020-10-02

## 2024-04-24 NOTE — HPI-TODAY'S VISIT:
2/23/2023 Elsy presents for follow-up of reflux, dysphagia, and constipation.  Her reflux is stable on omeprazole 40 mg daily.  She does have epigastric abdominal discomfort occasionally when lying down.  This relieves immediately, she associates this with her hernia.  Her bowels have been moving daily on a stool softener however she has had some increased right lower quadrant abdominal pain.  Her last colonoscopy was in 2019 with 1 benign polyp otherwise fairly normal.  The pain is relieved with a bowel movement.  We have discussed the differential, I suspect this is gas trapping and spasm.  I want her to add a half capful of MiraLAX daily.  We will do a KUB to rule out any kidney stone as her pain does radiate to her back at times.  Consider contrasted imaging if her symptoms worsen and no relief with a bowel regimen.  MB 8/31/2023 Elsy is an 83-year-old female who presents for evaluation of vomiting.  She states in the past 3 months she had 3 different episodes at 3 different times of the day with vomiting associated with defecation.  She states she has no nausea or abdominal pain associated with the episodes.  She states while straining she develops the urge to vomit and then retches until she is dry heaving.  Her last EGD was in 2020 with a 3 to 4 cm hiatal hernia with a history of Nissen fundoplication x2 at [facility] in the early 2000's.  She is on omeprazole 40 mg daily, she has been unable to wean.  She has no early satiety or bloating.  Her bowels are moving daily on 2 Colace, she is been able to stop the MiraLAX.  Today her main complaint is regurgitation with vomiting.  We will schedule upper endoscopy to rule out esophageal motility disorder or enlarging hiatal hernia.  We will also schedule upper GI.  She would likely benefit from low-dose Reglan however her age is a concern.  I want her to start eating small frequent meals and we will add famotidine before bedtime.  We will follow-up pending her work-up.  MB 10/25/2023 Elsy presents for follow-up of vomiting, and reflux.  Since her last visit her upper GI showed small hiatal hernia.  She started eating smaller meals and taking famotidine before supper.  She had no further episodes of vomiting with defecation.  Recently she saw an ENT for globus.  He is concerned for reflux.  He recommended pantoprazole but this is not covered by her insurance.  Her EGD in 2020 shows reflux and a Schatzki's ring along with a 4 cm hiatal hernia.  Today she agrees to omeprazole 40 mg twice daily and famotidine at night.  I want her to read the dropping acid reflux book, as she agrees her nutrition is poor.  She also has significant kyphosis which is likely also contributing to her reflux.  She is working with physical therapy and chair yoga.  Today her main complaint is globus.  Plan as above.  If no relief would consider repeat upper endoscopy.  MB 4/20/2024 Elsy presents for follow-up of reflux with globus, history of hiatal hernia, history of constipation, and colorectal cancer screening.  Since her last visit we increased her omeprazole to 40 mg twice daily and famotidine in the evening.  She may have seen some slight improvement but overall she is still waking up most mornings with globus.  Her upper GI last fall does show slight reflux and a small hiatal hernia with a history of Nissen fundoplication.  Today she agrees to wean the omeprazole back to 40 mg in the morning and famotidine in the evening to see if this makes a difference in her symptoms.  Her constipation has been fairly well-managed on Colace however she recently took an antibiotic with sulfa that caused some mild increase in symptoms.  This seems to have abated.  She has not had any further episodes of the projectile vomiting while defecating.  We have discussed trying to eat smaller meals.  We will hold off any Reglan or Michael Whitney.  Today she is doing fairly well [place]  We will make sure she gets her records up there as well.  MB

## 2024-05-23 ENCOUNTER — WEB ENCOUNTER (OUTPATIENT)
Dept: URBAN - NONMETROPOLITAN AREA CLINIC 2 | Facility: CLINIC | Age: 84
End: 2024-05-23

## 2024-05-23 RX ORDER — OMEPRAZOLE 20 MG/1
1 CAPSULE CAPSULE, DELAYED RELEASE ORAL TWICE DAILY
Qty: 180 CAPSULE | Refills: 3

## 2024-05-28 ENCOUNTER — WEB ENCOUNTER (OUTPATIENT)
Dept: URBAN - NONMETROPOLITAN AREA CLINIC 2 | Facility: CLINIC | Age: 84
End: 2024-05-28

## 2024-09-03 ENCOUNTER — ERX REFILL RESPONSE (OUTPATIENT)
Dept: URBAN - NONMETROPOLITAN AREA CLINIC 2 | Facility: CLINIC | Age: 84
End: 2024-09-03

## 2024-09-03 RX ORDER — FAMOTIDINE 40 MG/1
1 TABLET AT BEDTIME TABLET, FILM COATED ORAL ONCE A DAY
Qty: 90 TABLET | Refills: 3 | OUTPATIENT